# Patient Record
Sex: FEMALE | Race: WHITE | Employment: FULL TIME | URBAN - METROPOLITAN AREA
[De-identification: names, ages, dates, MRNs, and addresses within clinical notes are randomized per-mention and may not be internally consistent; named-entity substitution may affect disease eponyms.]

---

## 2019-09-04 ENCOUNTER — OFFICE VISIT (OUTPATIENT)
Dept: FAMILY MEDICINE CLINIC | Age: 54
End: 2019-09-04
Payer: COMMERCIAL

## 2019-09-04 VITALS
HEART RATE: 110 BPM | BODY MASS INDEX: 29.64 KG/M2 | WEIGHT: 157 LBS | HEIGHT: 61 IN | TEMPERATURE: 98.5 F | SYSTOLIC BLOOD PRESSURE: 110 MMHG | DIASTOLIC BLOOD PRESSURE: 72 MMHG | RESPIRATION RATE: 18 BRPM | OXYGEN SATURATION: 98 %

## 2019-09-04 DIAGNOSIS — J20.9 ACUTE BRONCHITIS, UNSPECIFIED ORGANISM: ICD-10-CM

## 2019-09-04 DIAGNOSIS — Z00.00 HEALTH CARE MAINTENANCE: Primary | ICD-10-CM

## 2019-09-04 PROBLEM — E78.2 MIXED HYPERLIPIDEMIA: Status: ACTIVE | Noted: 2017-02-03

## 2019-09-04 PROBLEM — R74.8 ELEVATED LIVER ENZYMES: Status: ACTIVE | Noted: 2017-02-03

## 2019-09-04 PROCEDURE — 99386 PREV VISIT NEW AGE 40-64: CPT | Performed by: FAMILY MEDICINE

## 2019-09-04 RX ORDER — AZITHROMYCIN 250 MG/1
TABLET, FILM COATED ORAL
Qty: 1 PACKET | Refills: 0 | Status: SHIPPED | OUTPATIENT
Start: 2019-09-04

## 2019-09-04 ASSESSMENT — ENCOUNTER SYMPTOMS
TROUBLE SWALLOWING: 0
DIARRHEA: 0
SINUS PRESSURE: 1
STRIDOR: 0
CONSTIPATION: 0
CHOKING: 0
CHEST TIGHTNESS: 0
COUGH: 1
NAUSEA: 0
VOMITING: 0
RHINORRHEA: 0
SHORTNESS OF BREATH: 0
WHEEZING: 0
SORE THROAT: 0
ABDOMINAL PAIN: 0
SINUS PAIN: 1
BACK PAIN: 0
EYE PAIN: 0

## 2019-09-04 ASSESSMENT — PATIENT HEALTH QUESTIONNAIRE - PHQ9
1. LITTLE INTEREST OR PLEASURE IN DOING THINGS: 0
SUM OF ALL RESPONSES TO PHQ QUESTIONS 1-9: 0
2. FEELING DOWN, DEPRESSED OR HOPELESS: 0
SUM OF ALL RESPONSES TO PHQ QUESTIONS 1-9: 0
SUM OF ALL RESPONSES TO PHQ9 QUESTIONS 1 & 2: 0

## 2019-09-04 NOTE — PROGRESS NOTES
by mouth every 6 hours as needed for Pain       No current facility-administered medications on file prior to visit. Review of Systems   Constitutional: Negative for appetite change, chills, fever and unexpected weight change. HENT: Positive for congestion, sinus pressure and sinus pain. Negative for ear discharge, ear pain, hearing loss, postnasal drip, rhinorrhea, sneezing, sore throat, tinnitus and trouble swallowing. Eyes: Negative for pain and visual disturbance. Respiratory: Positive for cough. Negative for choking, chest tightness, shortness of breath, wheezing and stridor. Cardiovascular: Negative for chest pain and palpitations. Gastrointestinal: Negative for abdominal pain, constipation, diarrhea, nausea and vomiting. Endocrine: Negative for cold intolerance, heat intolerance and polyuria. Genitourinary: Negative for decreased urine volume, difficulty urinating, flank pain, frequency and hematuria. Musculoskeletal: Negative for back pain, joint swelling and neck pain. Skin: Negative for rash and wound. Neurological: Negative for dizziness, tremors, syncope, weakness, light-headedness and headaches. Hematological: Negative for adenopathy. Psychiatric/Behavioral: Negative for confusion and self-injury. Objective:   /72 (Site: Left Upper Arm, Position: Sitting, Cuff Size: Medium Adult)   Pulse 110   Temp 98.5 °F (36.9 °C) (Temporal)   Resp 18   Ht 5' 1\" (1.549 m)   Wt 157 lb (71.2 kg)   SpO2 98%   BMI 29.66 kg/m²     Physical Exam   Constitutional: She is oriented to person, place, and time. She appears well-developed and well-nourished. HENT:   Head: Normocephalic and atraumatic. Nose: Nose normal.   Mouth/Throat: Oropharynx is clear and moist.   Eyes: Pupils are equal, round, and reactive to light. Conjunctivae and EOM are normal.   Neck: Normal range of motion. Neck supple. No thyromegaly present.    Cardiovascular: Normal rate, regular rhythm and

## 2019-10-10 DIAGNOSIS — Z00.00 HEALTH CARE MAINTENANCE: ICD-10-CM

## 2019-12-06 ENCOUNTER — HOSPITAL ENCOUNTER (OUTPATIENT)
Dept: WOMENS IMAGING | Age: 54
Discharge: HOME | End: 2019-12-08
Payer: COMMERCIAL

## 2019-12-06 DIAGNOSIS — Z00.00 HEALTH CARE MAINTENANCE: ICD-10-CM

## 2019-12-06 DIAGNOSIS — Z12.31 ENCOUNTER FOR SCREENING MAMMOGRAM FOR BREAST CANCER: ICD-10-CM

## 2019-12-06 PROCEDURE — 77063 BREAST TOMOSYNTHESIS BI: CPT

## 2024-09-15 ENCOUNTER — CLINICAL SUPPORT (OUTPATIENT)
Dept: URGENT CARE | Age: 59
End: 2024-09-15
Payer: COMMERCIAL

## 2024-09-15 ENCOUNTER — OFFICE VISIT (OUTPATIENT)
Dept: URGENT CARE | Age: 59
End: 2024-09-15
Payer: COMMERCIAL

## 2024-09-15 VITALS
OXYGEN SATURATION: 97 % | SYSTOLIC BLOOD PRESSURE: 135 MMHG | HEART RATE: 75 BPM | BODY MASS INDEX: 26.58 KG/M2 | RESPIRATION RATE: 16 BRPM | TEMPERATURE: 98.5 F | DIASTOLIC BLOOD PRESSURE: 69 MMHG | WEIGHT: 150 LBS | HEIGHT: 63 IN

## 2024-09-15 DIAGNOSIS — M54.16 LUMBAR BACK PAIN WITH RADICULOPATHY AFFECTING LEFT LOWER EXTREMITY: ICD-10-CM

## 2024-09-15 DIAGNOSIS — M25.78 DEGENERATION OF INTERVERTEBRAL DISC OF LUMBAR REGION WITH OSTEOPHYTE OF LUMBAR VERTEBRA: ICD-10-CM

## 2024-09-15 DIAGNOSIS — S39.012A STRAIN OF LUMBAR PARASPINAL MUSCLE, INITIAL ENCOUNTER: ICD-10-CM

## 2024-09-15 DIAGNOSIS — M51.36 LUMBAR DEGENERATIVE DISC DISEASE: Primary | ICD-10-CM

## 2024-09-15 DIAGNOSIS — M51.36 DEGENERATION OF INTERVERTEBRAL DISC OF LUMBAR REGION WITH OSTEOPHYTE OF LUMBAR VERTEBRA: ICD-10-CM

## 2024-09-15 PROCEDURE — 72120 X-RAY BEND ONLY L-S SPINE: CPT | Performed by: STUDENT IN AN ORGANIZED HEALTH CARE EDUCATION/TRAINING PROGRAM

## 2024-09-15 RX ORDER — METHYLPREDNISOLONE SODIUM SUCCINATE 125 MG/2ML
125 INJECTION INTRAMUSCULAR; INTRAVENOUS ONCE
Status: COMPLETED | OUTPATIENT
Start: 2024-09-15 | End: 2024-09-15

## 2024-09-15 RX ORDER — METHYLPREDNISOLONE 4 MG/1
4 TABLET ORAL ONCE
Qty: 21 TABLET | Refills: 0 | Status: SHIPPED | OUTPATIENT
Start: 2024-09-15 | End: 2024-09-15

## 2024-09-15 RX ORDER — ORPHENADRINE CITRATE 100 MG/1
100 TABLET, EXTENDED RELEASE ORAL NIGHTLY PRN
Qty: 7 TABLET | Refills: 0 | Status: SHIPPED | OUTPATIENT
Start: 2024-09-15

## 2024-09-15 RX ADMIN — METHYLPREDNISOLONE SODIUM SUCCINATE 125 MG: 125 INJECTION INTRAMUSCULAR; INTRAVENOUS at 11:03

## 2024-09-15 ASSESSMENT — PAIN SCALES - GENERAL: PAINLEVEL: 10-WORST PAIN EVER

## 2024-09-15 NOTE — PROGRESS NOTES
"Subjective   Patient ID: Hilda Cheatham is a 59 y.o. female. They present today with a chief complaint of Leg Pain (Knee radiating to toes x1-2 months ).    History of Present Illness  Hilda is a 59-year-old female who presents to the urgent care for evaluation of left-sided leg pain with numbness and tingling down to her toes that is been off and on for about 1 to 2 months.  Patient denies direct trauma or injury.  Patient denies any saddle paresthesia, foot drop or urinary or bowel incontinence.  Patient denies being seen in the past for any back issues however states she is missing work due to her symptoms.  Patient is seeking an office injection, treatment.  No other symptoms or concerns otherwise reported.      Past Medical History  Allergies as of 09/15/2024    (No Known Allergies)       (Not in a hospital admission)       No past medical history on file.    No past surgical history on file.         Review of Systems  A 10-point review of systems was performed, otherwise unremarkable unless stated in the history of present illness.                Objective    Vitals:    09/15/24 1021   BP: 135/69   Pulse: 75   Resp: 16   Temp: 36.9 °C (98.5 °F)   SpO2: 97%   Weight: 68 kg (150 lb)   Height: 1.6 m (5' 3\")     No LMP recorded.    Gen: Vitals noted and reviewed, no evidence of acute distress, well developed and afebrile.   Psych: Mood and affect appropriate for setting.  Head/Face: Atraumatic and normocephalic.   Cardiac: Regular rate and rhythm no murmur.   Lungs: Clear to auscultation throughout, No evidence of wheezing, rhonchi or crackles. Good aeration throughout.   MSK lumbar spine: Negative for obvious deformity or malalignment.  No midline tenderness, bony tenderness, crepitus or step-off.  Positive tenderness palpation along the left lumbar and sacral paraspinal muscles with minimal hypertonicity noted.  Full range of motion throughout actively and passively.  DTRs intact.  Full strength in bilateral " lower extremities.  Minimally antalgic gait.  Extremities: Symmetrical, No peripheral edema  Skin: Without evidence of ecchymosis, wounds, or rashes.      Point of Care Test & Imaging Results from this visit  No results found for this visit on 09/15/24.   XR lumbar spine 4+ views w flexion extension    Result Date: 9/15/2024  Interpreted By:  Shauna Claire, STUDY: XR LUMBAR SPINE 4+ VIEWS WITH FLEXION EXTENSION;  9/15/2024 10:56 am   INDICATION: Signs/Symptoms:LBP with radiculopathy on left.   ,M54.16 Radiculopathy, lumbar region   COMPARISON: None.   ACCESSION NUMBER(S): [id number]   ORDERING CLINICIAN: OMAR ROGERS   FINDINGS: Four views of the lumbar spine obtained.   Straightening of the mid lumbar lordosis. Limited excursion during flexion and extension. No significant spondylolisthesis. Lumbar vertebral heights are preserved. L3-4 intervertebral disc space narrowing with possible partially fused limbus vertebra anteriorly. Remaining intervertebral disc spaces are preserved. SI joints are grossly intact.       No lumbar vertebral displacement. Straightening of the lumbar lordosis with degenerative changes and possible limbus vertebra at L3-4.   MACRO: None.   Signed by: Shauna Claire 9/15/2024 11:17 AM Dictation workstation:   [id number]     Diagnostic study results (if any) were reviewed by Omar Rogers DO.    Assessment/Plan   Allergies, medications, history, and pertinent labs/EKGs/Imaging reviewed by Omar Rogers DO.     Medical Decision Making:  Discussed with the patient symptoms and clinical presentation findings are suggestive of left-sided radiculopathy likely secondary to underlying lumbar degenerative disc disease and possible acute lumbar paraspinal muscle strain with spasm.  We advise close symptom monitoring, supportive treatment and activity modifications.  We agreed to provide an in office dose of IM Solu-Medrol for symptom relief.  We also prescribed a Medrol Dosepak to start tomorrow  along with a muscle relaxer.  We reviewed red flag symptoms of low back pain with radiculopathy and advised close symptom monitoring and close follow-up with primary care provider regarding this. We advised seeking immediate emergency medical attention if symptoms fail to improve, worsen or any concerning symptoms arise. Patient voiced full understanding and agreement to plan.        Orders and Diagnoses  Diagnoses and all orders for this visit:  Lumbar degenerative disc disease  Lumbar back pain with radiculopathy affecting left lower extremity  -     XR lumbar spine 4+ views w flexion extension; Future  -     methylPREDNISolone sodium succinate (PF) (SOLU-Medrol) injection 125 mg  -     orphenadrine (Norflex) 100 mg 12 hr tablet; Take 1 tablet (100 mg) by mouth as needed at bedtime for muscle spasms. PRN low back muscle spasm. Do not crush, chew, or split.  -     methylPREDNISolone (Medrol Dospak) 4 mg tablets; Take 1 tablet (4 mg) by mouth 1 time for 1 dose. Follow schedule on package instructions. NOT a one time dose. Only to start tomorrow 9/16/2024, patient was given a dose of IM Solu-Medrol 125 mg in office on 9/15/24.  Strain of lumbar paraspinal muscle, initial encounter  -     orphenadrine (Norflex) 100 mg 12 hr tablet; Take 1 tablet (100 mg) by mouth as needed at bedtime for muscle spasms. PRN low back muscle spasm. Do not crush, chew, or split.  Degeneration of intervertebral disc of lumbar region with osteophyte of lumbar vertebra      Medical Admin Record  Administrations This Visit       methylPREDNISolone sodium succinate (PF) (SOLU-Medrol) injection 125 mg       Admin Date  09/15/2024 Action  Given Dose  125 mg Route  intramuscular Documented By  Caty Cobian MA                    Follow Up Instructions  No follow-ups on file.    Patient disposition: Home    Electronically signed by Omar Aly DO  1:15 PM

## 2024-09-15 NOTE — PATIENT INSTRUCTIONS
Follow up with PCP. We advised seeking immediate emergency medical attention if symptoms fail to improve, worsen or any concerning symptoms arise. Patient voiced full understanding and agreement to plan.

## 2024-10-04 ENCOUNTER — APPOINTMENT (OUTPATIENT)
Dept: PRIMARY CARE | Facility: CLINIC | Age: 59
End: 2024-10-04
Payer: COMMERCIAL

## 2024-10-04 VITALS
RESPIRATION RATE: 16 BRPM | HEART RATE: 87 BPM | TEMPERATURE: 98 F | OXYGEN SATURATION: 98 % | WEIGHT: 175.8 LBS | BODY MASS INDEX: 31.15 KG/M2 | HEIGHT: 63 IN | SYSTOLIC BLOOD PRESSURE: 120 MMHG | DIASTOLIC BLOOD PRESSURE: 60 MMHG

## 2024-10-04 DIAGNOSIS — Z12.11 COLON CANCER SCREENING: ICD-10-CM

## 2024-10-04 DIAGNOSIS — Z01.419 WELL FEMALE EXAM WITH ROUTINE GYNECOLOGICAL EXAM: ICD-10-CM

## 2024-10-04 DIAGNOSIS — Z78.0 ASYMPTOMATIC POSTMENOPAUSAL STATUS: ICD-10-CM

## 2024-10-04 DIAGNOSIS — Z13.820 OSTEOPOROSIS SCREENING: ICD-10-CM

## 2024-10-04 DIAGNOSIS — Z12.31 BREAST CANCER SCREENING BY MAMMOGRAM: ICD-10-CM

## 2024-10-04 DIAGNOSIS — Z00.00 HEALTHCARE MAINTENANCE: Primary | ICD-10-CM

## 2024-10-04 DIAGNOSIS — R53.83 OTHER FATIGUE: ICD-10-CM

## 2024-10-04 DIAGNOSIS — M54.42 LEFT-SIDED LOW BACK PAIN WITH LEFT-SIDED SCIATICA, UNSPECIFIED CHRONICITY: ICD-10-CM

## 2024-10-04 PROCEDURE — 93000 ELECTROCARDIOGRAM COMPLETE: CPT | Performed by: INTERNAL MEDICINE

## 2024-10-04 PROCEDURE — 3008F BODY MASS INDEX DOCD: CPT | Performed by: INTERNAL MEDICINE

## 2024-10-04 PROCEDURE — 99396 PREV VISIT EST AGE 40-64: CPT | Performed by: INTERNAL MEDICINE

## 2024-10-04 ASSESSMENT — ENCOUNTER SYMPTOMS
CONFUSION: 0
DIZZINESS: 0
SORE THROAT: 0
VOICE CHANGE: 0
NECK STIFFNESS: 0
RESPIRATORY NEGATIVE: 1
WOUND: 0
COLOR CHANGE: 0
ENDOCRINE NEGATIVE: 1
DIARRHEA: 0
SEIZURES: 0
EYE DISCHARGE: 0
VOMITING: 0
DIFFICULTY URINATING: 0
HEADACHES: 0
WHEEZING: 0
UNEXPECTED WEIGHT CHANGE: 1
WEAKNESS: 0
EYE PAIN: 0
TREMORS: 0
SINUS PAIN: 0
EYE REDNESS: 0
POLYPHAGIA: 0
LIGHT-HEADEDNESS: 0
FACIAL ASYMMETRY: 0
ARTHRALGIAS: 0
POLYDIPSIA: 0
EYES NEGATIVE: 1
BACK PAIN: 1
PALPITATIONS: 0
AGITATION: 0
NAUSEA: 0
BRUISES/BLEEDS EASILY: 0
SPEECH DIFFICULTY: 0
MYALGIAS: 0
TROUBLE SWALLOWING: 0
HALLUCINATIONS: 0
PSYCHIATRIC NEGATIVE: 1
CARDIOVASCULAR NEGATIVE: 1
HEMATOLOGIC/LYMPHATIC NEGATIVE: 1
FLANK PAIN: 0
ABDOMINAL PAIN: 0
APPETITE CHANGE: 0
COUGH: 0
CHEST TIGHTNESS: 0
DYSURIA: 0
CONSTIPATION: 0
STRIDOR: 0
NECK PAIN: 0
BLOOD IN STOOL: 0
NERVOUS/ANXIOUS: 0
GASTROINTESTINAL NEGATIVE: 1
SHORTNESS OF BREATH: 0
JOINT SWELLING: 0
SINUS PRESSURE: 0
FREQUENCY: 0
ADENOPATHY: 0
SLEEP DISTURBANCE: 0
NUMBNESS: 0
ACTIVITY CHANGE: 0
ALLERGIC/IMMUNOLOGIC NEGATIVE: 1
NEUROLOGICAL NEGATIVE: 1

## 2024-10-04 ASSESSMENT — PATIENT HEALTH QUESTIONNAIRE - PHQ9
2. FEELING DOWN, DEPRESSED OR HOPELESS: NOT AT ALL
SUM OF ALL RESPONSES TO PHQ9 QUESTIONS 1 AND 2: 0
1. LITTLE INTEREST OR PLEASURE IN DOING THINGS: NOT AT ALL

## 2024-10-04 NOTE — PROGRESS NOTES
Subjective   Patient ID: Hilda Cheatham is a 59 y.o. female who presents for Annual Exam (Pt is here due to annual physical).  HPI    Patient has been feeling pretty good and has been complaint with prescribed medications.    We reviewed and discussed details of  blood work: CBC, CMP, TSH, Lipid panel done in 2022.  Results within acceptable range.      Last mammogram: 2022  DEXA: ordered  PAP: overdue  Colonoscopy: ordered       Patient developed low back pain 2 months ago. Pain has been radiating to left leg.  Denies any recent or remote fall, denies  pushing, pulling, lifting heavy objects.   Denies any new urinary symptoms, no change in bowel habits. Denies fever, chills. No focal weakness or numbness.    Went to urgent care 2 weeks ago.  Had X-ray done which showed mild degenerative changes L spine, no fx.  Advised Medrol pack.    Patient is overdue for all her preventive testing, we discussed importance of having them done soon, orders placed.    Weight gain noted.  We discussed importance of losing weight, limiting calories, carbohydrates, increasing physical activities.     Review of Systems   Constitutional:  Positive for unexpected weight change. Negative for activity change and appetite change.   HENT: Negative.  Negative for congestion, ear discharge, ear pain, hearing loss, mouth sores, nosebleeds, sinus pressure, sinus pain, sore throat, trouble swallowing and voice change.    Eyes: Negative.  Negative for pain, discharge, redness and visual disturbance.   Respiratory: Negative.  Negative for cough, chest tightness, shortness of breath, wheezing and stridor.    Cardiovascular: Negative.  Negative for chest pain, palpitations and leg swelling.   Gastrointestinal: Negative.  Negative for abdominal pain, blood in stool, constipation, diarrhea, nausea and vomiting.   Endocrine: Negative.  Negative for polydipsia, polyphagia and polyuria.   Genitourinary: Negative.  Negative for difficulty urinating,  "dysuria, flank pain, frequency and urgency.   Musculoskeletal:  Positive for back pain. Negative for arthralgias, gait problem, joint swelling, myalgias, neck pain and neck stiffness.   Skin: Negative.  Negative for color change, rash and wound.   Allergic/Immunologic: Negative.  Negative for environmental allergies, food allergies and immunocompromised state.   Neurological: Negative.  Negative for dizziness, tremors, seizures, syncope, facial asymmetry, speech difficulty, weakness, light-headedness, numbness and headaches.   Hematological: Negative.  Negative for adenopathy. Does not bruise/bleed easily.   Psychiatric/Behavioral: Negative.  Negative for agitation, behavioral problems, confusion, hallucinations, sleep disturbance and suicidal ideas. The patient is not nervous/anxious.    All other systems reviewed and are negative.      Objective     Review of systems was performed and all systems were negative except what in HPI    /60 (BP Location: Right arm, Patient Position: Sitting, BP Cuff Size: Large adult)   Pulse 87   Temp 36.7 °C (98 °F) (Temporal)   Resp 16   Ht 1.6 m (5' 3\")   Wt 79.7 kg (175 lb 12.8 oz)   SpO2 98%   BMI 31.14 kg/m²    Physical Exam  Vitals and nursing note reviewed.   Constitutional:       General: She is not in acute distress.     Appearance: Normal appearance.   HENT:      Head: Normocephalic and atraumatic.      Right Ear: Tympanic membrane, ear canal and external ear normal.      Left Ear: Tympanic membrane, ear canal and external ear normal.      Nose: Nose normal. No congestion or rhinorrhea.      Mouth/Throat:      Mouth: Mucous membranes are moist.      Pharynx: Oropharynx is clear.   Eyes:      General:         Right eye: No discharge.         Left eye: No discharge.      Extraocular Movements: Extraocular movements intact.      Conjunctiva/sclera: Conjunctivae normal.      Pupils: Pupils are equal, round, and reactive to light.   Cardiovascular:      Rate and " Rhythm: Normal rate and regular rhythm.      Pulses: Normal pulses.      Heart sounds: Normal heart sounds. No murmur heard.     No friction rub. No gallop.   Pulmonary:      Effort: Pulmonary effort is normal. No respiratory distress.      Breath sounds: Normal breath sounds. No stridor. No wheezing, rhonchi or rales.   Chest:      Chest wall: No tenderness.   Abdominal:      General: Bowel sounds are normal.      Palpations: Abdomen is soft. There is no mass.      Tenderness: There is no abdominal tenderness. There is no guarding or rebound.   Musculoskeletal:         General: No swelling or deformity. Normal range of motion.      Cervical back: Normal range of motion and neck supple. No rigidity or tenderness.      Right lower leg: No edema.      Left lower leg: No edema.   Lymphadenopathy:      Cervical: No cervical adenopathy.   Skin:     General: Skin is warm and dry.      Coloration: Skin is not jaundiced.      Findings: No bruising or erythema.   Neurological:      General: No focal deficit present.      Mental Status: She is alert and oriented to person, place, and time. Mental status is at baseline.      Cranial Nerves: No cranial nerve deficit.      Motor: No weakness.      Coordination: Coordination normal.      Gait: Gait normal.   Psychiatric:         Mood and Affect: Mood normal.         Behavior: Behavior normal.         Thought Content: Thought content normal.         Judgment: Judgment normal.         Assessment/Plan   Problem List Items Addressed This Visit             ICD-10-CM       Health Encounters    Healthcare maintenance - Primary Z00.00    Relevant Orders    ECG 12 lead (Clinic Performed) (Completed)    CBC    Comprehensive Metabolic Panel    Lipid Panel    Urinalysis with Reflex Microscopic       Musculoskeletal and Injuries    Left-sided low back pain with left-sided sciatica M54.42     Start PT as discussed.         Relevant Orders    Referral to Physical Therapy     Other Visit  Diagnoses         Codes    Breast cancer screening by mammogram     Z12.31    Relevant Orders    BI mammo bilateral screening tomosynthesis    Asymptomatic postmenopausal status     Z78.0    Relevant Orders    XR DEXA bone density    Osteoporosis screening     Z13.820    Relevant Orders    XR DEXA bone density    Other fatigue     R53.83    Relevant Orders    TSH with reflex to Free T4 if abnormal    Well female exam with routine gynecological exam     Z01.419    Relevant Orders    Referral to Gynecology    Colon cancer screening     Z12.11    Relevant Orders    Colonoscopy Screening; Average Risk Patient        It was a pleasure to see you today.  I would like to remind you about importance of a healthy lifestyle in order to improve your well-being and live longer.  Try to engage in physical activities for at least 150 minutes per week.  Eat about 10 servings of fruits and vegetables daily. My advice is 2 servings of fruits and 8 servings of vegetables.  For vegetables choose at least half of them green and at least half of them fresh.  Please avoid sugar, salt, fried food and saturated fat.    F/up in 1 year or sooner if needed.

## 2024-10-12 ENCOUNTER — HOSPITAL ENCOUNTER (OUTPATIENT)
Dept: RADIOLOGY | Facility: CLINIC | Age: 59
Discharge: HOME | End: 2024-10-12
Payer: COMMERCIAL

## 2024-10-12 DIAGNOSIS — Z13.820 OSTEOPOROSIS SCREENING: ICD-10-CM

## 2024-10-12 DIAGNOSIS — Z78.0 ASYMPTOMATIC POSTMENOPAUSAL STATUS: ICD-10-CM

## 2024-10-12 PROCEDURE — 77080 DXA BONE DENSITY AXIAL: CPT | Performed by: RADIOLOGY

## 2024-10-12 PROCEDURE — 77080 DXA BONE DENSITY AXIAL: CPT

## 2024-10-14 ENCOUNTER — APPOINTMENT (OUTPATIENT)
Dept: PRIMARY CARE | Facility: CLINIC | Age: 59
End: 2024-10-14
Payer: COMMERCIAL

## 2024-10-20 NOTE — RESULT ENCOUNTER NOTE
Your bone density scan showed osteopenia. Please maintain enough calcium in your diet:  5041-7349 mg daily (consume foods rich in calcium rather than taking only calcium supplement to meet daily calcium requirements), take daily Vitamin D supplement with meal. Average Vit D dose is 2000 international units daily.  Weight bearing exercise routine is recommended. We will discuss details during your next office visit.  Dr. Jaffe

## 2024-10-28 ENCOUNTER — HOSPITAL ENCOUNTER (OUTPATIENT)
Dept: RADIOLOGY | Facility: CLINIC | Age: 59
Discharge: HOME | End: 2024-10-28
Payer: COMMERCIAL

## 2024-10-28 VITALS — BODY MASS INDEX: 31.01 KG/M2 | HEIGHT: 63 IN | WEIGHT: 175 LBS

## 2024-10-28 DIAGNOSIS — Z12.31 BREAST CANCER SCREENING BY MAMMOGRAM: ICD-10-CM

## 2024-10-28 PROCEDURE — 77063 BREAST TOMOSYNTHESIS BI: CPT

## 2024-10-28 PROCEDURE — 77063 BREAST TOMOSYNTHESIS BI: CPT | Performed by: RADIOLOGY

## 2024-10-28 PROCEDURE — 77067 SCR MAMMO BI INCL CAD: CPT | Performed by: RADIOLOGY

## 2024-10-30 ENCOUNTER — APPOINTMENT (OUTPATIENT)
Dept: OBSTETRICS AND GYNECOLOGY | Facility: CLINIC | Age: 59
End: 2024-10-30
Payer: COMMERCIAL

## 2024-10-30 VITALS
HEIGHT: 62 IN | SYSTOLIC BLOOD PRESSURE: 148 MMHG | WEIGHT: 174.4 LBS | BODY MASS INDEX: 32.09 KG/M2 | DIASTOLIC BLOOD PRESSURE: 86 MMHG

## 2024-10-30 DIAGNOSIS — Z12.4 CERVICAL CANCER SCREENING: ICD-10-CM

## 2024-10-30 DIAGNOSIS — Z01.419 WELL FEMALE EXAM WITH ROUTINE GYNECOLOGICAL EXAM: ICD-10-CM

## 2024-10-30 PROCEDURE — 99386 PREV VISIT NEW AGE 40-64: CPT

## 2024-10-30 PROCEDURE — 1036F TOBACCO NON-USER: CPT

## 2024-10-30 PROCEDURE — 87624 HPV HI-RISK TYP POOLED RSLT: CPT

## 2024-10-30 PROCEDURE — 3008F BODY MASS INDEX DOCD: CPT

## 2024-10-30 ASSESSMENT — PAIN SCALES - GENERAL: PAINLEVEL_OUTOF10: 0-NO PAIN

## 2024-11-08 ENCOUNTER — APPOINTMENT (OUTPATIENT)
Dept: PHYSICAL THERAPY | Facility: CLINIC | Age: 59
End: 2024-11-08
Payer: COMMERCIAL

## 2024-11-08 LAB
CYTOLOGY CMNT CVX/VAG CYTO-IMP: NORMAL
HPV HR 12 DNA GENITAL QL NAA+PROBE: NEGATIVE
HPV HR GENOTYPES PNL CVX NAA+PROBE: NEGATIVE
HPV16 DNA SPEC QL NAA+PROBE: NEGATIVE
HPV18 DNA SPEC QL NAA+PROBE: NEGATIVE
LAB AP HPV GENOTYPE QUESTION: YES
LAB AP HPV HR: NORMAL
LABORATORY COMMENT REPORT: NORMAL
PATH REPORT.TOTAL CANCER: NORMAL

## 2024-12-04 ENCOUNTER — APPOINTMENT (OUTPATIENT)
Dept: PHYSICAL THERAPY | Facility: CLINIC | Age: 59
End: 2024-12-04
Payer: COMMERCIAL

## 2025-10-07 ENCOUNTER — APPOINTMENT (OUTPATIENT)
Dept: PRIMARY CARE | Facility: CLINIC | Age: 60
End: 2025-10-07
Payer: COMMERCIAL